# Patient Record
Sex: MALE | Employment: STUDENT | URBAN - METROPOLITAN AREA
[De-identification: names, ages, dates, MRNs, and addresses within clinical notes are randomized per-mention and may not be internally consistent; named-entity substitution may affect disease eponyms.]

---

## 2017-01-06 ENCOUNTER — HOSPITAL ENCOUNTER (OUTPATIENT)
Age: 22
Discharge: HOME | End: 2017-01-06
Attending: EMERGENCY MEDICINE
Payer: COMMERCIAL

## 2017-01-06 VITALS
OXYGEN SATURATION: 98 % | TEMPERATURE: 98 F | RESPIRATION RATE: 18 BRPM | DIASTOLIC BLOOD PRESSURE: 75 MMHG | WEIGHT: 192 LBS | BODY MASS INDEX: 29.1 KG/M2 | SYSTOLIC BLOOD PRESSURE: 140 MMHG | HEART RATE: 97 BPM | HEIGHT: 68 IN

## 2017-01-06 DIAGNOSIS — J11.1 INFLUENZA-LIKE ILLNESS: Primary | ICD-10-CM

## 2017-01-06 LAB — S PYO AG THROAT QL: NEGATIVE

## 2017-01-06 PROCEDURE — 87430 STREP A AG IA: CPT

## 2017-01-06 PROCEDURE — 99204 OFFICE O/P NEW MOD 45 MIN: CPT

## 2017-01-06 PROCEDURE — 99213 OFFICE O/P EST LOW 20 MIN: CPT

## 2017-01-06 RX ORDER — BENZONATATE 200 MG/1
200 CAPSULE ORAL 3 TIMES DAILY PRN
Qty: 15 CAPSULE | Refills: 0 | Status: SHIPPED | OUTPATIENT
Start: 2017-01-06 | End: 2017-01-11

## 2017-01-06 NOTE — ED PROVIDER NOTES
Patient Seen in: [facility] In [address] [place] [address]    History   Patient presents with:  Cough/URI  Sore Throat    Stated Complaint: sore throat    HPI    For the last 4 days patient complains of cough fever body aches and no energy.   Patient h negative except as noted above. PSFH elements reviewed from today and agreed except as otherwise stated in HPI.     Physical Exam       ED Triage Vitals   BP 01/06/17 1422 140/75 mmHg   Pulse 01/06/17 1422 97   Resp 01/06/17 1422 18   Temp 01/06/17 1422 (primary encounter diagnosis)    Disposition:  Discharge    Follow-up:  Jacquelin Huynh, [address]  [phone]    Schedule an appointment as soon as possible for a visit in 1 week  Follow up with your doctor is dieudonne

## 2017-06-08 ENCOUNTER — OFFICE VISIT (OUTPATIENT)
Dept: FAMILY MEDICINE CLINIC | Facility: CLINIC | Age: 22
End: 2017-06-08

## 2017-06-08 VITALS
OXYGEN SATURATION: 98 % | SYSTOLIC BLOOD PRESSURE: 120 MMHG | HEIGHT: 68 IN | DIASTOLIC BLOOD PRESSURE: 80 MMHG | TEMPERATURE: 98 F | BODY MASS INDEX: 29.4 KG/M2 | RESPIRATION RATE: 12 BRPM | HEART RATE: 84 BPM | WEIGHT: 194 LBS

## 2017-06-08 DIAGNOSIS — Z00.00 ENCOUNTER FOR WELL ADULT EXAM WITHOUT ABNORMAL FINDINGS: Primary | ICD-10-CM

## 2017-06-08 PROCEDURE — 99385 PREV VISIT NEW AGE 18-39: CPT | Performed by: NURSE PRACTITIONER

## 2017-06-08 NOTE — PROGRESS NOTES
CHIEF COMPLAINT:   Patient presents with:  Employment Physical      HPI:   Aniket Craven is a 22year old male who presents for a physical exam for work physical.   Patient has no health concerns. No current outpatient prescriptions on file. bleeding disorders  ENDOCRINE: denies thyroid history  ALLERGY/ASTHMA: denies hx of seasonal allergies or asthma    EXAM:   /80 mmHg  Pulse 84  Temp(Src) 98.3 °F (36.8 °C) (Oral)  Resp 12  Ht 68\"  Wt 194 lb  BMI 29.50 kg/m2  SpO2 98%  Body mass inde

## 2017-06-08 NOTE — PATIENT INSTRUCTIONS
Prevention Guidelines, Men Ages 25 to 44  Screening tests and vaccines are an important part of managing your health. Health counseling is essential, too. Below are guidelines for these, for men ages 25 to 44.  Talk with your healthcare provider to make s Hepatitis B Men at increased risk for infection – talk with your healthcare provider 3 doses over 6 months; second dose should be given 1 month after the first dose; the third dose should be given at least 2 months after the second dose and at least 4 robert Date Last Reviewed: 3/30/2015  © 4468-3472 [address], [address] [place], [place] [address]. All rights reserved. This information is not intended as a substitute for professional medical care.  Always follow your healthcare professional

## 2017-07-05 ENCOUNTER — TELEPHONE (OUTPATIENT)
Dept: OPHTHALMOLOGY | Facility: CLINIC | Age: 22
End: 2017-07-05

## 2017-07-05 NOTE — TELEPHONE ENCOUNTER
Spoke to pts mom and explained to her that we have nothing available prior to him leaving for school on August 11. I told her we can pt him on the waiting list and as soon as we have a pt cancel we can call her back to schedule him.    If not then mom will

## 2017-07-05 NOTE — TELEPHONE ENCOUNTER
Pts mother asking if pt can be seen sooner than 8/11? States pt will go away to college by that date. Pls advise thank you.

## 2017-11-17 ENCOUNTER — TELEPHONE (OUTPATIENT)
Dept: OPHTHALMOLOGY | Facility: CLINIC | Age: 22
End: 2017-11-17

## 2017-11-17 NOTE — TELEPHONE ENCOUNTER
Patients mother calling to schedule appt. Patient is coming home from school for break states will be home from today until 11/25 and then will come back again on 12/15 until 01/12/18 would like to know if patient can be fit in during those dates for EE.  P

## 2017-12-18 ENCOUNTER — OFFICE VISIT (OUTPATIENT)
Dept: OPHTHALMOLOGY | Facility: CLINIC | Age: 22
End: 2017-12-18

## 2017-12-18 DIAGNOSIS — H52.13 MYOPIA OF BOTH EYES WITH ASTIGMATISM: ICD-10-CM

## 2017-12-18 DIAGNOSIS — H01.004 BLEPHARITIS OF UPPER EYELIDS OF BOTH EYES, UNSPECIFIED TYPE: ICD-10-CM

## 2017-12-18 DIAGNOSIS — H52.203 MYOPIA OF BOTH EYES WITH ASTIGMATISM: ICD-10-CM

## 2017-12-18 DIAGNOSIS — H01.001 BLEPHARITIS OF UPPER EYELIDS OF BOTH EYES, UNSPECIFIED TYPE: ICD-10-CM

## 2017-12-18 DIAGNOSIS — H40.003 GLAUCOMA SUSPECT OF BOTH EYES: Primary | ICD-10-CM

## 2017-12-18 PROCEDURE — 92014 COMPRE OPH EXAM EST PT 1/>: CPT | Performed by: OPHTHALMOLOGY

## 2017-12-18 NOTE — ASSESSMENT & PLAN NOTE
OCT done today showing borderline thinning in the left eye and mildly increased cupping in both eyes. C/D 0.4 OU  Return in 4 months for IOP and recheck OCT.

## 2017-12-18 NOTE — PROGRESS NOTES
Ronny May is a 25year old male. HPI:     HPI     EP/  25year old her for a complete exam.  LDE was on 4/13/15 with a hx of myopia and astigmatism.   Patient is wearing Acuvue 2 CL everyday for about 12 hours a day, he  throws them away every 2 PERRL None    Left PERRL None          Visual Fields (Counting fingers)       Left Right     Full Full          Extraocular Movement       Right Left     Full, Ortho Full, Ortho          Dilation     Both eyes:  1.0% Mydriacyl and 2.5% Pato Synephrine @ 10: done today showing borderline thinning in the left eye and mildly increased cupping in both eyes. C/D 0.4 OU  Return in 4 months for IOP and recheck OCT. Myopia of both eyes with astigmatism  New glasses and contacts given; suggest update.      Preston Price

## 2018-01-29 ENCOUNTER — TELEPHONE (OUTPATIENT)
Dept: OPHTHALMOLOGY | Facility: CLINIC | Age: 23
End: 2018-01-29

## 2018-01-29 NOTE — TELEPHONE ENCOUNTER
Patients mother states patient is away at school right now and out of contacts requesting copy of glasses and contacts rx to be sent to the [facility] in [place], [address] Ph. [phone] Fx. [phone]. Please send. Thank you.

## 2018-05-18 ENCOUNTER — OFFICE VISIT (OUTPATIENT)
Dept: OPHTHALMOLOGY | Facility: CLINIC | Age: 23
End: 2018-05-18

## 2018-05-18 DIAGNOSIS — H52.13 MYOPIA OF BOTH EYES WITH ASTIGMATISM: ICD-10-CM

## 2018-05-18 DIAGNOSIS — H52.203 MYOPIA OF BOTH EYES WITH ASTIGMATISM: ICD-10-CM

## 2018-05-18 DIAGNOSIS — H40.003 GLAUCOMA SUSPECT OF BOTH EYES: Primary | ICD-10-CM

## 2018-05-18 PROCEDURE — 92012 INTRM OPH EXAM EST PATIENT: CPT | Performed by: OPHTHALMOLOGY

## 2018-05-18 PROCEDURE — 92133 CPTRZD OPH DX IMG PST SGM ON: CPT | Performed by: OPHTHALMOLOGY

## 2018-05-18 NOTE — ASSESSMENT & PLAN NOTE
IOP 16/16 adjusted 14/14. OCT stable. Recommend consultation with Dr. Sumla Kemp sometime this summer.

## 2018-05-18 NOTE — PATIENT INSTRUCTIONS
Glaucoma suspect of both eyes  IOP 16/16 adjusted 14/14  OCT stable. Recommend consultation with Dr. Hardeep Galaviz sometime this summer.     Myopia of both eyes with astigmatism  Continue glasses and contacts

## 2018-05-18 NOTE — PROGRESS NOTES
Nadia Mahmood is a 25year old male. HPI:     HPI     EP/ 25 yr old here for an OCT and IOP check.  LDE 12/18/17 with hx of myopia, astigmatism, blepharitis and glaucoma suspect due to borderline thinning in the left eye and mildly increased cupping Pachymetry (12/18/2017)       Right Left    Thickness 570/-2 571/-2          Pupils       Pupils APD    Right PERRL None    Left PERRL None          Extraocular Movement       Right Left     Full, Ortho Full, Ortho            Slit Lamp and Fundus Exam

## 2018-08-15 ENCOUNTER — OFFICE VISIT (OUTPATIENT)
Dept: INTERNAL MEDICINE CLINIC | Facility: CLINIC | Age: 23
End: 2018-08-15
Payer: COMMERCIAL

## 2018-08-15 VITALS
HEIGHT: 68 IN | BODY MASS INDEX: 28.95 KG/M2 | HEART RATE: 76 BPM | TEMPERATURE: 99 F | DIASTOLIC BLOOD PRESSURE: 86 MMHG | WEIGHT: 191 LBS | SYSTOLIC BLOOD PRESSURE: 130 MMHG

## 2018-08-15 DIAGNOSIS — R00.2 PALPITATIONS: Primary | ICD-10-CM

## 2018-08-15 DIAGNOSIS — F41.9 ANXIETY: ICD-10-CM

## 2018-08-15 PROCEDURE — 99203 OFFICE O/P NEW LOW 30 MIN: CPT | Performed by: INTERNAL MEDICINE

## 2018-08-15 PROCEDURE — 99212 OFFICE O/P EST SF 10 MIN: CPT | Performed by: INTERNAL MEDICINE

## 2018-08-15 NOTE — PROGRESS NOTES
Baron Goodell is a 25year old male. HPI:   Patient presents with: Anxiety: Pt complains of anxiety. He has been bothered by it for awhile, but he wants to deal with it.     Previous MD - Dr Lotus Cobos    26 y/o M here with c/o anxiety x several years total) by mouth daily.  Disp: 30 tablet Rfl: 5       Allergies:  No Known Allergies            ROS:   Constitutional: no weight loss; no fatigue  ENMT:  Negative for ear drainage, hearing loss and nasal drainage  Eyes:  Negative for eye discharge and vision to monitor q 6 mos    Myopia  Wears contact lenses; sees ophtho Dr Maurine Bloch    Right labral shoulder tear  By MRI in 2013         Orders This Visit:  No orders of the defined types were placed in this encounter.       Meds This Visit:    Signed Prescriptions

## 2018-10-05 NOTE — TELEPHONE ENCOUNTER
Refill request received for sertraline 50mg from [facility]. Rx sent 8/15/18 for #30 with 5 refills to [facility].     Called patient to clarify refill request. Pelon Marvin.

## 2018-11-21 ENCOUNTER — OFFICE VISIT (OUTPATIENT)
Dept: INTERNAL MEDICINE CLINIC | Facility: CLINIC | Age: 23
End: 2018-11-21
Payer: COMMERCIAL

## 2018-11-21 VITALS
SYSTOLIC BLOOD PRESSURE: 128 MMHG | BODY MASS INDEX: 30.46 KG/M2 | DIASTOLIC BLOOD PRESSURE: 74 MMHG | WEIGHT: 201 LBS | HEART RATE: 86 BPM | TEMPERATURE: 99 F | HEIGHT: 68 IN

## 2018-11-21 DIAGNOSIS — F41.9 ANXIETY: ICD-10-CM

## 2018-11-21 DIAGNOSIS — R00.2 PALPITATIONS: Primary | ICD-10-CM

## 2018-11-21 PROCEDURE — 90686 IIV4 VACC NO PRSV 0.5 ML IM: CPT | Performed by: INTERNAL MEDICINE

## 2018-11-21 PROCEDURE — 90471 IMMUNIZATION ADMIN: CPT | Performed by: INTERNAL MEDICINE

## 2018-11-21 PROCEDURE — 99213 OFFICE O/P EST LOW 20 MIN: CPT | Performed by: INTERNAL MEDICINE

## 2018-11-21 PROCEDURE — 99212 OFFICE O/P EST SF 10 MIN: CPT | Performed by: INTERNAL MEDICINE

## 2018-11-21 NOTE — PROGRESS NOTES
Consuelo Healy is a 26year old male.     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         PHYSICAL EXAM:   Blood pressure 128/74, pulse 86, temperature 99.1 °F (37.3 °C), temperature source Oral, height 5' 8\" (1.727 m), weight

## 2018-12-28 NOTE — TELEPHONE ENCOUNTER
Current refill request refused due to refill is either a duplicate request or has active refills at the pharmacy. Check previous templates.     Requested Prescriptions     Refused Prescriptions Disp Refills   • SERTRALINE HCL 50 MG Oral Tab [Pharmacy Med N

## 2019-08-14 ENCOUNTER — OPTICAL REFILL REQUEST (OUTPATIENT)
Dept: OPHTHALMOLOGY | Facility: CLINIC | Age: 24
End: 2019-08-14

## 2019-08-14 NOTE — TELEPHONE ENCOUNTER
Patient's mom called asking for refill on patient's contacts. Dr. Omar Mack told mom that is okay to refill CLS. RX printed and faxed to [address] [place] [address] at [phone] as requested.  Godwin Dudley

## 2020-07-28 DIAGNOSIS — S43.439A SUPERIOR GLENOID LABRUM LESION: ICD-10-CM

## 2020-07-28 DIAGNOSIS — Z11.59 SPECIAL SCREENING EXAMINATION FOR UNSPECIFIED VIRAL DISEASE: Primary | ICD-10-CM

## 2020-08-04 ENCOUNTER — LAB SERVICES (OUTPATIENT)
Dept: LAB | Age: 25
End: 2020-08-04

## 2020-08-04 DIAGNOSIS — Z11.59 SPECIAL SCREENING EXAMINATION FOR UNSPECIFIED VIRAL DISEASE: ICD-10-CM

## 2020-08-04 DIAGNOSIS — Z11.59 SPECIAL SCREENING EXAMINATION FOR UNSPECIFIED VIRAL DISEASE: Primary | ICD-10-CM

## 2020-08-04 PROCEDURE — U0003 INFECTIOUS AGENT DETECTION BY NUCLEIC ACID (DNA OR RNA); SEVERE ACUTE RESPIRATORY SYNDROME CORONAVIRUS 2 (SARS-COV-2) (CORONAVIRUS DISEASE [COVID-19]), AMPLIFIED PROBE TECHNIQUE, MAKING USE OF HIGH THROUGHPUT TECHNOLOGIES AS DESCRIBED BY CMS-2020-01-R: HCPCS | Performed by: FAMILY MEDICINE

## 2020-08-05 LAB
SARS-COV-2 RNA RESP QL NAA+PROBE: NOT DETECTED
SERVICE CMNT-IMP: NORMAL
SPECIMEN SOURCE: NORMAL

## 2020-11-02 ENCOUNTER — OFFICE VISIT (OUTPATIENT)
Dept: OPHTHALMOLOGY | Facility: CLINIC | Age: 25
End: 2020-11-02
Payer: COMMERCIAL

## 2020-11-02 DIAGNOSIS — H01.001 BLEPHARITIS OF UPPER EYELIDS OF BOTH EYES, UNSPECIFIED TYPE: ICD-10-CM

## 2020-11-02 DIAGNOSIS — H52.13 MYOPIA OF BOTH EYES WITH ASTIGMATISM: ICD-10-CM

## 2020-11-02 DIAGNOSIS — H40.003 GLAUCOMA SUSPECT OF BOTH EYES: Primary | ICD-10-CM

## 2020-11-02 DIAGNOSIS — H52.203 MYOPIA OF BOTH EYES WITH ASTIGMATISM: ICD-10-CM

## 2020-11-02 DIAGNOSIS — H01.004 BLEPHARITIS OF UPPER EYELIDS OF BOTH EYES, UNSPECIFIED TYPE: ICD-10-CM

## 2020-11-02 PROCEDURE — 92014 COMPRE OPH EXAM EST PT 1/>: CPT | Performed by: OPHTHALMOLOGY

## 2020-11-02 PROCEDURE — 92133 CPTRZD OPH DX IMG PST SGM ON: CPT | Performed by: OPHTHALMOLOGY

## 2020-11-02 NOTE — PATIENT INSTRUCTIONS
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 Apply baby shampoo to warm washcloth and scrub eyelids gently with eyes closed, then rinse thoroughly.         Glaucoma suspect of both eyes  IOP good 16/15 adjusted 14

## 2020-11-02 NOTE — PROGRESS NOTES
Baron Goodell is a 26year old male. HPI:     HPI     EP/ 26year old here for a complete exam. LDE 12/18/17, last seen 5/18/18 with history of glaucoma suspect OU and myopia with astigmatism OU.   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

## 2021-01-04 ENCOUNTER — OFFICE VISIT (OUTPATIENT)
Dept: OPHTHALMOLOGY | Facility: CLINIC | Age: 26
End: 2021-01-04
Payer: COMMERCIAL

## 2021-01-04 DIAGNOSIS — H52.203 MYOPIA OF BOTH EYES WITH ASTIGMATISM: ICD-10-CM

## 2021-01-04 DIAGNOSIS — H52.13 MYOPIA OF BOTH EYES WITH ASTIGMATISM: ICD-10-CM

## 2021-01-04 DIAGNOSIS — H01.004 BLEPHARITIS OF UPPER EYELIDS OF BOTH EYES, UNSPECIFIED TYPE: Primary | ICD-10-CM

## 2021-01-04 DIAGNOSIS — H01.001 BLEPHARITIS OF UPPER EYELIDS OF BOTH EYES, UNSPECIFIED TYPE: Primary | ICD-10-CM

## 2021-01-04 NOTE — PROGRESS NOTES
Doniskeyur Garner is a 26year old male. HPI:     HPI     EP/ 26 yr old here for a recheck vision with contacts.  LDE 11/2/2020 with Hx of myopia, astigmatism, blepharitis and is a glaucoma suspect due to borderline thinning in the left eye and mildly i 30 tablet 6       Allergies:  No Known Allergies    ROS:       PHYSICAL EXAM:     Base Eye Exam     Visual Acuity (Snellen - Linear)       Right Left    Dist cc 20/20 -2 20/20    Near cc 20/20 20/20    Correction: Contacts          Pachymetry (12/18/2017) lid hygiene daily. Apply baby shampoo to warm washcloth and scrub eyelids gently with eyes closed, then rinse thoroughly. No orders of the defined types were placed in this encounter.       Meds This Visit:  Requested Prescriptions      No prescri

## 2021-01-18 NOTE — PATIENT INSTRUCTIONS
Glaucoma suspect of both eyes  OCT done today showing borderline thinning in the left eye and increased cupping in both eyes. Return in 4 months for IOP and recheck OCT.      Myopia of both eyes with astigmatism  New glasses and contacts given; suggest up
52.2

## (undated) NOTE — ED AVS SNAPSHOT
[facility] in [place] [address].  [place] [address]    Phone:  [phone]    Fax:  [phone]           Gavin Estrada   MRN: [id number]    Department:  [facility] in [address] [place] [address]   Date of Visi Emergency Department. Discharge References/Attachments     INFLUENZA  (ENGLISH)      Disclosure     Insurance plans vary and the physician(s) referred by the Immediate Care may not be covered by your plan.   It is possible that the physician may not pa IF THERE IS ANY CHANGE OR WORSENING OF YOUR CONDITION, CALL YOUR PRIMARY CARE PHYSICIAN AT ONCE OR GO TO THE EMERGENCY DEPARTMENT.     If you have been prescribed any medication(s), please fill your prescription right away and begin taking the medication(s) harming yourself, contact [facility] at [phone]. - If you don’t have insurance, Fariba Bowen has partnered with Patient [address] to help you get signed up for insurance coverage.   Patient [place]

## (undated) NOTE — MR AVS SNAPSHOT
[address] [facility] [address]  [place] [address]  [phone]               Thank you for choosing us for your health care visit with Kaleigh Seth NP.   We are glad to serve you and happy to provide you with this summary of your HIV All men At routine exams   Obesity All men in this age group At routine exams   Syphilis Men at increased risk for infection – talk with your healthcare provider At routine exams   Tuberculosis Men at increased risk for infection – talk with your healt against 23 types of pneumococcal bacteria)   Tetanus/diphtheria/pertussis (Td/Tdap) booster All men in this age group A one-time Tdap booster after age 25, then Td every10 years   Counseling Who needs it How often   Diet and exercise Overweight or obese pe Expires: 8/7/2017  3:56 PM    If you have questions, you can call [phone] to talk to our [facility] Staff. Remember, [employer] is NOT to be used for urgent needs. For medical emergencies, dial 911.         Educational Information     Healthy Diet